# Patient Record
Sex: MALE | Race: WHITE
[De-identification: names, ages, dates, MRNs, and addresses within clinical notes are randomized per-mention and may not be internally consistent; named-entity substitution may affect disease eponyms.]

---

## 2018-09-20 ENCOUNTER — HOSPITAL ENCOUNTER (EMERGENCY)
Dept: HOSPITAL 5 - ED | Age: 68
Discharge: HOME | End: 2018-09-20
Payer: MEDICARE

## 2018-09-20 VITALS — DIASTOLIC BLOOD PRESSURE: 72 MMHG | SYSTOLIC BLOOD PRESSURE: 138 MMHG

## 2018-09-20 DIAGNOSIS — J45.909: ICD-10-CM

## 2018-09-20 DIAGNOSIS — M10.9: Primary | ICD-10-CM

## 2018-09-20 DIAGNOSIS — E78.5: ICD-10-CM

## 2018-09-20 DIAGNOSIS — I10: ICD-10-CM

## 2018-09-20 PROCEDURE — 36415 COLL VENOUS BLD VENIPUNCTURE: CPT

## 2018-09-20 PROCEDURE — 93971 EXTREMITY STUDY: CPT

## 2018-09-20 PROCEDURE — 99284 EMERGENCY DEPT VISIT MOD MDM: CPT

## 2018-09-20 PROCEDURE — 96372 THER/PROPH/DIAG INJ SC/IM: CPT

## 2018-09-20 PROCEDURE — 84550 ASSAY OF BLOOD/URIC ACID: CPT

## 2018-09-20 NOTE — EMERGENCY DEPARTMENT REPORT
ED Lower Extremity HPI





- General


Chief Complaint: Extremity Injury, Lower


Stated Complaint: LEFT LEG PAIN


Time Seen by Provider: 09/20/18 14:02


Source: patient


Mode of arrival: Wheelchair


Limitations: No Limitations





- History of Present Illness


Initial Comments: 





This is a 68-year-old male nontoxic, well nourished in appearance, no acute 

signs of distress presents to the ED with c/o of left ankle and foot pain 1 

week.  Patient stated that he has history of gout and this is a typically gout 

flareup.  PAtient staed that his PCP ALVINO Ortez sent patient for r/o DVT for a 

doppler study.  Patient denies any other trauma.  Patient denies any numbness, 

tingling, fever, chills, nausea, vomiting, chest pain, shortness of breath, 

headache, stiff neck. Patient denies any calf pain or tenderness.  Patient 

denies decreased range of motion.  Patient stated has decreased gait due to 

pain.  Patient denies any allergies.  PMH includes asthma, HTNm and gout.


MD Complaint: ankle injury, foot injury


-: week(s) (1)


Injury: Ankle: Left, Foot: Left


Severity: mild


Severity scale (0 -10): 8


Improves With: immobilization


Worsens With: weight bearing, palpation


Associated Symptoms: swelling, able to partially bear weight.  denies: snap/pop 

sensation, numbness, tingling, unable to bear weight, ambulatory





- Related Data


 Previous Rx's











 Medication  Instructions  Recorded  Last Taken  Type


 


Diclofenac Sodium [Voltaren-Xr] 100 mg PO DAILY PRN #15 tab.er.24h 09/20/18 

Unknown Rx


 


Prednisone [predniSONE 10 mg 10 mg PO .TAPER #1 tab.ds.pk 09/20/18 Unknown Rx





(6-Day Pack, 21 Tabs)]    











 Allergies











Allergy/AdvReac Type Severity Reaction Status Date / Time


 


No Known Allergies Allergy   Unverified 09/20/18 12:28














ED Review of Systems


ROS: 


Stated complaint: LEFT LEG PAIN


Other details as noted in HPI





Constitutional: denies: chills, fever


Eyes: denies: eye pain, eye discharge, vision change


ENT: denies: ear pain, throat pain


Respiratory: denies: cough, shortness of breath, wheezing


Cardiovascular: denies: chest pain, palpitations


Endocrine: no symptoms reported


Gastrointestinal: denies: abdominal pain, nausea, diarrhea


Genitourinary: denies: urgency, dysuria


Musculoskeletal: denies: back pain, joint swelling, arthralgia


Skin: denies: rash, lesions


Neurological: denies: headache, weakness, paresthesias


Psychiatric: denies: anxiety, depression


Hematological/Lymphatic: denies: easy bleeding, easy bruising





ED Past Medical Hx





- Past Medical History


Previous Medical History?: Yes


Hx Hypertension: Yes


Hx Asthma: Yes


Additional medical history: Gout, hyperlipidemia





- Surgical History


Past Surgical History?: Yes


Additional Surgical History: hernia repair at 1 yrs old





- Social History


Smoking Status: Never Smoker


Substance Use Type: Alcohol





- Medications


Home Medications: 


 Home Medications











 Medication  Instructions  Recorded  Confirmed  Last Taken  Type


 


Diclofenac Sodium [Voltaren-Xr] 100 mg PO DAILY PRN #15 tab.er.24h 09/20/18  

Unknown Rx


 


Prednisone [predniSONE 10 mg 10 mg PO .TAPER #1 tab.ds.pk 09/20/18  Unknown Rx





(6-Day Pack, 21 Tabs)]     














ED Physical Exam





- General


Limitations: No Limitations


General appearance: alert, in no apparent distress





- Head


Head exam: Present: atraumatic, normocephalic





- Eye


Eye exam: Present: normal appearance





- ENT


ENT exam: Present: mucous membranes moist





- Neck


Neck exam: Present: normal inspection





- Respiratory


Respiratory exam: Present: normal lung sounds bilaterally.  Absent: respiratory 

distress





- Cardiovascular


Cardiovascular Exam: Present: regular rate, normal rhythm.  Absent: systolic 

murmur, diastolic murmur, rubs, gallop





- GI/Abdominal


GI/Abdominal exam: Present: soft, normal bowel sounds





- Rectal


Rectal exam: Present: deferred





- Extremities Exam


Extremities exam: Present: normal inspection, full ROM (with pain), tenderness, 

normal capillary refill.  Absent: joint swelling, calf tenderness





- Expanded Lower Extremity Exam


  ** Left


Hip exam: Present: normal inspection, full ROM.  Absent: tenderness, swelling


Upper Leg exam: Present: normal inspection, full ROM.  Absent: tenderness, 

swelling


Knee exam: Present: normal inspection, full ROM.  Absent: tenderness, swelling


Lower Leg exam: Present: normal inspection, full ROM.  Absent: tenderness, 

swelling


Ankle exam: Present: normal inspection, full ROM, tenderness, swelling.  Absent

: abrasion, laceration, ecchymosis, deformity, crepidus, dislocation, erythema, 

anterior draw sign


Foot/Toe exam: Present: normal inspection, full ROM, tenderness, swelling.  

Absent: abrasion, laceration, ecchymosis, deformity, crepidus, dislocation, 

erythema, amputation, puncture wound, foreign body, calcaneal tenderness, 

tenderness at base of 5th metatarsal, nail avulsion, subungual hematoma


Neuro vascular tendon exam: Present: no vascular compromise.  Absent: pulse 

deficit, abnormal cap refill, motor deficit, sensory deficit, tendon deficit, 

extremity cold to touch, pallor, abnormal 2-point discrimination, decreased fine

/light touch, foot drop, peroneal nerve deficit, significant pain with passive 

ROM of distal joint


Gait: Positive: observed and limited by pain





- Back Exam


Back exam: Present: normal inspection, full ROM





- Neurological Exam


Neurological exam: Present: alert, oriented X3, normal gait





- Psychiatric


Psychiatric exam: Present: normal affect, normal mood





- Skin


Skin exam: Present: warm, dry, intact, normal color.  Absent: rash





ED Course


 Vital Signs











  09/20/18 09/20/18





  12:11 15:22


 


Temperature 98.5 F 


 


Pulse Rate 77 


 


Respiratory 18 20





Rate  


 


Blood Pressure 145/81 


 


O2 Sat by Pulse 97 





Oximetry  














- Reevaluation(s)


Reevaluation #1: 





09/20/18 14:46


Patient is speaking in full sentences with no signs of distress noted.





ED Lower Extremity MDM





- Medical Decision Making





This is a 68-year-old male that presents with gout flareup.  Patient is stable 

and was examined by me.  As per ALVINO Ortez (PCP) doppler US obtained with no 

evidence of DVT/SVT.  Patient stated this is a typical gout flareup.  Uric acid 

obtained with symptoms has been there for 2 weeks. Patient does have normal 

gait with no tenderness and no joint swelling.  No ecchymosis. no joint redness 

or swelling. Not warm to touch. No signs of cellulites present.  Patient was 

instructed to RICE therapy.  Patient received Decadron and Voltran for pain.  

Patient is discharged with Voltran and prednisone. At time of discharge, the 

patient does not seem toxic or ill in appearance.  No acute signs of distress 

noted.  Patient agrees to discharge treatment plan of care.  No further 

questions noted by the patient.


Critical care attestation.: 


If time is entered above; I have spent that time in minutes in the direct care 

of this critically ill patient, excluding procedure time.








ED Disposition


Clinical Impression: 


Gout flare


Qualifiers:


 Gout site: ankle Gout etiology: unspecified cause Laterality: left Qualified 

Code(s): M10.9 - Gout, unspecified





Disposition: DC-01 TO HOME OR SELFCARE


Is pt being admited?: No


Does the pt Need Aspirin: No


Condition: Stable


Instructions:  Acute Gouty Arthritis (ED)


Additional Instructions: 


Follow-up with a primary care/orthopedic doctor in 3-5 days or if symptoms 

worsen and continue return to emergency room as soon as possible. 


Prescriptions: 


Diclofenac Sodium [Voltaren-Xr] 100 mg PO DAILY PRN #15 tab.er.24h


 PRN Reason: Pain , Severe (7-10)


Prednisone [predniSONE 10 mg (6-Day Pack, 21 Tabs)] 10 mg PO .TAPER #1 tab.ds.pk


Referrals: 


PRIMARY CARE,MD [Primary Care Provider] - 3-5 Days


PORFIRIO MELO MD [Staff Physician] - 3-5 Days


MEHDI GOODWIN MD [Staff Physician] - 3-5 Days


[facility] [Outside] - 3-5 Days

## 2018-09-23 NOTE — VASCULAR LAB REPORT
Left Lower Extremity Venous Duplex Study:



Reason for Exam: Pain and swelling of the left lower extremity.



Comments on the Right:

A limited duplex study was done of the proximal veins of the right 

lower extremity. All veins visualized are freely compressible without 

evidence of internal echogenicity.  Flow is spontaneous and phasic 

throughout. No evidence of acute or chronic thrombus is seen in any of 

the vessels visualized.



Comments on the Left:

All veins visualized are freely compressible without evidence of 

internal echogenicity.  Flow is spontaneous and phasic throughout. No 

evidence of acute or chronic thrombus is seen in any of the vessels 

visualized.  A nonspecific soft tissue changes seen in the medial left 

ankle.  This may be soft tissue injury or hematoma.



Impression:

No evidence of acute or chronic deep venous thrombosis in the left 

lower extremity.

A nonspecific soft tissue changes seen around the left ankle.  It 

appears relatively deeper on the level of the bone.  This may be 

hematoma or soft tissue injury.  Clinical correlation is recommended.